# Patient Record
Sex: FEMALE | Race: WHITE | Employment: UNEMPLOYED | URBAN - METROPOLITAN AREA
[De-identification: names, ages, dates, MRNs, and addresses within clinical notes are randomized per-mention and may not be internally consistent; named-entity substitution may affect disease eponyms.]

---

## 2022-12-02 ENCOUNTER — HOSPITAL ENCOUNTER (EMERGENCY)
Age: 42
Discharge: HOME | End: 2022-12-02
Attending: EMERGENCY MEDICINE

## 2022-12-02 VITALS
WEIGHT: 205 LBS | TEMPERATURE: 97.9 F | HEART RATE: 97 BPM | OXYGEN SATURATION: 96 % | HEIGHT: 64 IN | SYSTOLIC BLOOD PRESSURE: 126 MMHG | DIASTOLIC BLOOD PRESSURE: 82 MMHG | BODY MASS INDEX: 35 KG/M2 | RESPIRATION RATE: 20 BRPM

## 2022-12-02 DIAGNOSIS — R22.0 SWOLLEN UPPER LIP: ICD-10-CM

## 2022-12-02 DIAGNOSIS — R21 RASH: Primary | ICD-10-CM

## 2022-12-02 PROCEDURE — A4216 STERILE WATER/SALINE, 10 ML: HCPCS

## 2022-12-02 PROCEDURE — 2500000003 HC RX 250 WO HCPCS

## 2022-12-02 PROCEDURE — 2580000003 HC RX 258

## 2022-12-02 PROCEDURE — 99284 EMERGENCY DEPT VISIT MOD MDM: CPT

## 2022-12-02 PROCEDURE — 6360000002 HC RX W HCPCS

## 2022-12-02 RX ORDER — DIPHENHYDRAMINE HYDROCHLORIDE 50 MG/ML
25 INJECTION INTRAMUSCULAR; INTRAVENOUS ONCE
Status: COMPLETED | OUTPATIENT
Start: 2022-12-02 | End: 2022-12-02

## 2022-12-02 RX ORDER — LABETALOL 100 MG/1
TABLET, FILM COATED ORAL
COMMUNITY

## 2022-12-02 RX ORDER — 0.9 % SODIUM CHLORIDE 0.9 %
1000 INTRAVENOUS SOLUTION INTRAVENOUS ONCE
Status: COMPLETED | OUTPATIENT
Start: 2022-12-02 | End: 2022-12-02

## 2022-12-02 RX ORDER — DIPHENHYDRAMINE HCL 25 MG
25 CAPSULE ORAL EVERY 6 HOURS PRN
COMMUNITY

## 2022-12-02 RX ORDER — METHYLPREDNISOLONE SODIUM SUCCINATE 125 MG/2ML
125 INJECTION, POWDER, LYOPHILIZED, FOR SOLUTION INTRAMUSCULAR; INTRAVENOUS ONCE
Status: COMPLETED | OUTPATIENT
Start: 2022-12-02 | End: 2022-12-02

## 2022-12-02 RX ORDER — EPINEPHRINE 0.3 MG/.3ML
0.3 INJECTION SUBCUTANEOUS ONCE
Qty: 0.3 ML | Refills: 0 | Status: SHIPPED | OUTPATIENT
Start: 2022-12-02 | End: 2022-12-02

## 2022-12-02 RX ORDER — PREDNISONE 20 MG/1
20 TABLET ORAL 2 TIMES DAILY
Qty: 10 TABLET | Refills: 0 | Status: SHIPPED | OUTPATIENT
Start: 2022-12-02 | End: 2022-12-07

## 2022-12-02 RX ORDER — PREDNISONE 10 MG/1
10 TABLET ORAL DAILY
COMMUNITY
End: 2022-12-02

## 2022-12-02 RX ADMIN — SODIUM CHLORIDE 1000 ML: 0.9 INJECTION, SOLUTION INTRAVENOUS at 12:53

## 2022-12-02 RX ADMIN — DIPHENHYDRAMINE HYDROCHLORIDE 25 MG: 50 INJECTION, SOLUTION INTRAMUSCULAR; INTRAVENOUS at 12:40

## 2022-12-02 RX ADMIN — METHYLPREDNISOLONE SODIUM SUCCINATE 125 MG: 125 INJECTION, POWDER, FOR SOLUTION INTRAMUSCULAR; INTRAVENOUS at 12:40

## 2022-12-02 RX ADMIN — FAMOTIDINE 20 MG: 10 INJECTION, SOLUTION INTRAVENOUS at 12:40

## 2022-12-02 ASSESSMENT — ENCOUNTER SYMPTOMS
VOICE CHANGE: 0
GASTROINTESTINAL NEGATIVE: 1
ALLERGIC/IMMUNOLOGIC NEGATIVE: 1
TROUBLE SWALLOWING: 0
FACIAL SWELLING: 1
RESPIRATORY NEGATIVE: 1
SORE THROAT: 0
RHINORRHEA: 0
EYES NEGATIVE: 1

## 2022-12-02 NOTE — ED PROVIDER NOTES
ED Attending Attestation Note     Date of evaluation: 12/2/2022    This patient was seen by the BRANDY. I have seen and examined the patient, agree with the workup, evaluation, management and diagnosis. The care plan has been discussed. I was present for any procedures performed in the BRANDY's note and have made edits to the note where appropriate. My assessment reveals 43 y.o. female with history of breast cancer on chemotherapy presenting to the emergency department today for rash. She has findings in the ED as well as pictures of what are most consistent with urticaria as well as some trace lip edema. No vocal changes, hoarseness, oropharyngeal edema, mishandling secretions, stridor, or airway posturing. Lungs are clear to auscultation bilaterally. No indication for epinephrine at this time but will continue to treat symptomatically for allergic reaction. The exact etiology is unclear, however she will continue Benadryl and steroids as an outpatient and we have prescribed an EpiPen with instructions on use and to return immediately to the emergency department or call 911 if used.         Josep Wang MD  12/02/22 [id number]

## 2022-12-02 NOTE — ED PROVIDER NOTES
[address] [facility]          Advanced Practice Provider NOTE       Date of evaluation: 12/2/2022    Chief Complaint     Rash      History of Present Illness     Kristy Penny is a 43 y.o. female with past medical history of breast cancer currently receiving chemo treatment, who presents with swelling to upper lip and chin with rash to bilateral upper arms, and upper legs. Patient that she was seen Wednesday at [facility] for similar episode. Patient states I think my  washed some of my clothes in [address]. Patient denies any shortness of breath, chest pain, GI upset. Patient states \"just feels like I have lip implants in\". Patient denies any new foods, or any other changes that she is aware of. Review of Systems     Review of Systems   Constitutional: Negative. HENT:  Positive for facial swelling. Negative for rhinorrhea, sore throat, tinnitus, trouble swallowing and voice change. Eyes: Negative. Respiratory: Negative. Cardiovascular: Negative. Gastrointestinal: Negative. Endocrine: Negative. Genitourinary: Negative. Allergic/Immunologic: Negative. Neurological: Negative. Hematological: Negative. Psychiatric/Behavioral: Negative. Past Medical, Surgical, Family, and Social History     She has a past medical history of Cancer ([facility] [address] She has no past surgical history on file. Her family history is not on file. She     Medications     Previous Medications    DIPHENHYDRAMINE (BENADRYL) 25 MG CAPSULE    Take 25 mg by mouth every 6 hours as needed for Itching    LABETALOL (NORMODYNE) 100 MG TABLET    Take by mouth    LORATADINE (CLARITIN PO)    Take by mouth       Allergies     She has No Known Allergies. Physical Exam     INITIAL VITALS: BP: (!) 114/55, Temp: 97.9 °F (36.6 °C), Heart Rate: 97, Resp: 20, SpO2: 96 %  Physical Exam  Constitutional:       General: She is not in acute distress.      Appearance: She is normal weight. She is not ill-appearing or toxic-appearing. HENT:      Head: Normocephalic and atraumatic. Nose: Nose normal.      Mouth/Throat:      Mouth: Mucous membranes are moist.      Pharynx: Oropharynx is clear. Comments: Upper lip swelling left greater than the right, minor chin swelling with redness. Patient denies any difficulty swallowing  Eyes:      Pupils: Pupils are equal, round, and reactive to light. Cardiovascular:      Rate and Rhythm: Normal rate and regular rhythm. Heart sounds: No murmur heard. No friction rub. No gallop. Pulmonary:      Effort: Pulmonary effort is normal.      Breath sounds: Normal breath sounds. No stridor. No wheezing or rales. Chest:      Chest wall: No tenderness. Abdominal:      General: Abdomen is flat. Bowel sounds are normal. There is no distension. Palpations: Abdomen is soft. Tenderness: There is no abdominal tenderness. Musculoskeletal:      Cervical back: Normal range of motion. Skin:     Findings: Rash present. Comments: Pruritic red nonraised rash bilateral forearms bilateral upper thighs and waist   Neurological:      General: No focal deficit present. Mental Status: She is alert and oriented to person, place, and time. Cranial Nerves: No cranial nerve deficit. Psychiatric:      Comments: Anxious       Diagnostic Results         RADIOLOGY:  No orders to display       LABS:   No results found for this visit on 12/02/22. ED BEDSIDE ULTRASOUND:  No results found. RECENT VITALS:  BP: 126/82, Temp: 97.9 °F (36.6 °C), Heart Rate: 97, Resp: 20, SpO2: 96 %     Procedures         ED Course     Nursing Notes, Past Medical Hx,Past Surgical Hx, Social Hx, Allergies, and Family Hx were reviewed.          The patient was given the following medications:  Orders Placed This Encounter   Medications    0.9 % sodium chloride bolus    diphenhydrAMINE (BENADRYL) injection 25 mg    methylPREDNISolone sodium (SOLU-MEDROL) injection 125 mg    famotidine (PEPCID) 20 mg in sodium chloride (PF) 0.9 % 10 mL injection    EPINEPHrine (EPIPEN 2-MARTHA) 0.3 MG/0.3ML SOAJ injection     Sig: Inject 0.3 mLs into the muscle once for 1 dose Use as directed for allergic reaction     Dispense:  0.3 mL     Refill:  0    predniSONE (DELTASONE) 20 MG tablet     Sig: Take 1 tablet by mouth 2 times daily for 5 days     Dispense:  10 tablet     Refill:  0       CONSULTS:  None    MEDICAL DECISION MAKING / ASSESSMENT / Michael Raul is a 43 y.o. female with a history of breast cancer undergoing chemo treatment, With upper lip swelling, hives like rash to forearms and upper thighs. Patient dates she was seen Wednesday at [facility] for similar event and discharged home. Patient dates swelling started this morning on her way to work. Patient states that the rash is pruritic, nondraining. Patient states her lip feels swollen, denies any shortness of breath, difficulty swallowing. Patient denies cough, SOB or chest pain. Patient's condition is concerning for allergic reaction with unknown cause. Patient was given IV fluid bolus, Solu-Medrol, Pepcid, Benadryl. After reassessment patient has had a significant decrease in upper lip swelling and hive-like reaction to arms and legs. Patient states she still have some itching residual however feels much better. Patient's condition is consistent with allergic reaction. Patient be discharged home with an EpiPen, prednisone prescription. Discussed with patient to follow-up with her oncologist and family doctor this week. Patient is in agreements with plan for discharge, and verbalizes understanding. This patient was also evaluated by the attending physician Dr Aide Pickett. All care plans were discussed and agreed upon. Clinical Impression     1. Rash    2. Swollen upper lip        Disposition     PATIENT REFERRED TO:  No follow-up provider specified.     DISCHARGE MEDICATIONS:  New Prescriptions    EPINEPHRINE (EPIPEN 2-MARTHA) 0.3 MG/0.3ML SOAJ INJECTION    Inject 0.3 mLs into the muscle once for 1 dose Use as directed for allergic reaction    PREDNISONE (DELTASONE) 20 MG TABLET    Take 1 tablet by mouth 2 times daily for 5 days       DISPOSITION Decision To Discharge 12/02/2022 02:11:44 PM       BRENNA Vu NP  12/02/22 [id number]